# Patient Record
Sex: FEMALE | Race: WHITE
[De-identification: names, ages, dates, MRNs, and addresses within clinical notes are randomized per-mention and may not be internally consistent; named-entity substitution may affect disease eponyms.]

---

## 2018-01-29 ENCOUNTER — HOSPITAL ENCOUNTER (OUTPATIENT)
Dept: HOSPITAL 96 - M.PC | Age: 63
End: 2018-01-29
Payer: COMMERCIAL

## 2018-01-29 DIAGNOSIS — Z87.891: ICD-10-CM

## 2018-01-29 DIAGNOSIS — M47.892: Primary | ICD-10-CM

## 2018-01-29 DIAGNOSIS — G89.29: ICD-10-CM

## 2018-01-29 DIAGNOSIS — G44.209: ICD-10-CM

## 2018-01-31 NOTE — PAINCON
[address]  [address]                    PAIN MANAGEMENT CONSULTATION  
_______________________________________________________________________________
 
Name:       SHERRILL STROUD                Room:                      REG CLI 
M.R.#:  [id number]      Account #:      [id number]  
Admission:  01/29/18     Attend Phys:    Jarek Wynne DO 
Discharge:               Date of Birth:  [birth year]  
         Report #: [id number]
                                                                     [id number]  
_______________________________________________________________________________
THIS REPORT FOR:  //name//                      
 
CC: Jarek Leary DO
 
DATE OF SERVICE:  01/29/2018
 
 
REFERRING PHYSICIAN:  Calista Leary DO
 
CHIEF COMPLAINT:  Neck pain, upper back and chronic headache pain.
 
HISTORY OF PRESENT ILLNESS:  As you know, the patient is a 62-year-old female
who returns today in followup visit with continued neck pain and chronic tension
headaches.  The patient indicates a pain level of 6/10 today.  She states pain
is exacerbated with cold temperature, sitting, walking, standing, lifting,
bending, and certain activities, improves with medications and rest.  The
patient returns for refill of medications.  She has received a prior
authorization request for her Butrans patch.  She indicates good efficacy with
the Butrans patch, in fact she notes waning activity towards the end of the week
with the Butrans patch with recurrence of pain that is intensified up to a level
10/10.  Once the patch is changed and she receives the normal dosing, her pain
reduces back down to its normal level.  She returns to discuss options for
treatment and her concerns of the prior auth for Butrans patch.
 
ALLERGIES:  SULFA.
 
CURRENT MEDICATIONS:  Alprazolam 0.5 mg every 8 hours, baclofen 10 mg t.i.d.,
Rexulti 1 mg per day, Butrans 15 mcg patch q. week, Advair Diskus 250/50, two
puffs b.i.d., Robinul 1 mg twice a day, levothyroxine 100 mcg per day,
methocarbamol 750 mg p.r.n., mirtazapine 15 mg p.o. at bedtime, Percocet 10/325
one tab every 6 hours p.r.n. for pain, Protonix 40 mg per day, Seroquel 25 mg
once a day, and Viibryd 40 mg per day.
 
SOCIAL HISTORY:  The patient continues to smoke somewhere around a pack of
tobacco per day and has done so for years.  Denies IV or illicit drug use. 
Denies any chronic alcohol use.  She is unaccompanied today.
 
IMAGING:  No new imaging available.
 
PHYSICAL EXAMINATION:
VITAL SIGNS:  Blood pressure 151/77, pulse 73, respiratory rate 16 and
unlabored, the patient is 96% on room air, current temperature 98.3 degrees
Fahrenheit, height 5 feet 2-1/2 inches tall, weight 144 pounds, and BMI
calculated 26.
 
 
 
[place] [address]                    PAIN MANAGEMENT CONSULTATION  
_______________________________________________________________________________
 
Name:       SHERRILL STROUD GA                Room:                      REG CLI 
M.R.#:  [id number]      Account #:      [id number]  
Admission:  01/29/18     Attend Phys:    Jarek Wynne DO 
Discharge:               Date of Birth:  [birth year]  
         Report #: [id number]
                                                                     [id number]  
_______________________________________________________________________________
GENERAL:  Well-developed, well-nourished, well-hydrated 62-year-old female,
appears her stated age, smells strongly of tobacco smoke, placing current pain
score 6/10.
HEENT:  Normocephalic, atraumatic.  Pupils are equal, round, and reactive to
light.  Extraocular muscles are intact.
EXTREMITIES:  Show no clubbing, no cyanosis, and no edema.
MUSCULOSKELETAL:  There is palpatory tenderness over the paraspinal musculature
of the cervical spine, no spinous process tenderness.  Cervical provocation
testing includes extension, rotation, lateral flexion all intensify axial neck
and upper back pain.  Moderate-to-severe restriction of motion with lateral
flexion and rotation.  Upper extremity strength appears equal and symmetrical. 
Spurlings test is equivocal.
 
ASSESSMENT:
1.  Cervical spondylosis without radicular symptoms.
2.  Chronic neck pain.
3.  Tension headache.
4.  Cervicalgia.
5.  Chronic intractable pain.
 
PLAN:
1.  The patient returns today in followup visit where we have discussed at
length opioid medication management and the new opioid restrictions proposed by
CDC.  The patient does note good efficacy with Butrans patch, we recommend the
patient continue on the Butrans in favor of a long-term, long-acting medication
formulation, this reduces the potential for tolerance and certainly does reduce
the potential of addiction potentials.  The patient does note good efficacy with
the Butrans patch.  In fact, she notes when the Butrans patch begins to wane in
activity as the end week she states her pain intensifies fairly consistently,
which would indicate that she is getting excellent benefit with the Butrans
patch to the point where she has to use a very little of her breakthrough pain
medication, but at the end of the week, she tends to have to increase the
reliance on oxycodone significantly.  Recommend she continue on this Butrans
patch.  It does appear the patient has received a letter from her third party
payer indicating the Butrans will require prior authorization, this should not
be a problem as the patient is noticing good efficacy with the medication, she
is noticing no side effects to its use and is getting good benefit.  She has
been on this medication for a very long period of time and it has been used
appropriately and provides good analgesic benefit.  The patient was given
prescriptions of the Butrans in prescription form today.
2.  The patient was provided a prescription of Butrans 15 mcg patch 1 patch per
week, #4, release date of today, 4 weeks from today, 8 weeks from today, 3
months' worth of medication.  The patient needs to turn this in about 2 weeks
early, so that the authorization process can be completed before she runs low of
the medication.
3.  The patient was provided a prescription of Percocet 10/325 one tab p.o. q.6
 
 
 
[place] [address]                    PAIN MANAGEMENT CONSULTATION  
_______________________________________________________________________________
 
Name:       SHERRILL STROUD                Room:                      REG CLI 
MKaciKaci#:  [id number]      Account #:      [id number]  
Admission:  01/29/18     Attend Phys:    Jarek Wynne DO 
Discharge:               Date of Birth:  [birth year]  
         Report #: [id number]
                                                                     [id number]  
_______________________________________________________________________________
hours p.r.n. for pain, #120, release dates of today, 4 weeks from today, 8 weeks
from today.  Strongly recommend the patient begin weaning off of this immediate
release opioid.  This medication has lessening affect due to the ongoing use of
buprenorphine and we recommend that she try to reduce the use of this medication
when possible.  Further reductions in this therapy would be recommended at our
next visit, reducing to 3 a day, ultimately coming off the medication in a
timely fashion if at all possible.
4.  The patient and I did discuss the possibility of having her see neurology in
regards to Botox injections for her tension headaches.  This is an effective
treatment option for her chronic tension headaches, which is her main complaint
today.  I have offered to send the patient to neurology for the evaluation and
treatment.  She wishes to consider her options before moving forward.  I highly
recommend this to the patient.  If the patient wishes, she can contact our
clinic and we will provide the referrals as quickly as possible, I believe that
this therapy will improve the patient's headache pain.
5.  We will see the patient back in followup visit in 3 months.  I have
discussed with the patient our desire to have her to be smoke free, smoking has
had direct correlations to chronic pain and the propagation of chronic pain
signaling.  This is done through the descending pain pathway.  I have
recommended to the patient on smoking cessation as part of our treatment goals. 
We have offered to the patient medical management as well as cognitive
behavioral therapy treatments for this in the past and have reoffered again
today.  The patient is going to consider this as an option.
6.  We will see the patient back in followup visit in 3 months for medication
management.
 
 
 
 
 
 
 
 
 
 
 
 
 
 
 
 
 
 
 
<ELECTRONICALLY SIGNED>
                                        By:  Jarek Wynne DO          
01/31/18     0900
D: 01/29/18 0843_______________________________________
T: 01/29/18 1050Jabruce Wynne DO             /nt

## 2018-04-24 ENCOUNTER — HOSPITAL ENCOUNTER (OUTPATIENT)
Dept: HOSPITAL 96 - M.PC | Age: 63
End: 2018-04-24
Attending: ANESTHESIOLOGY
Payer: COMMERCIAL

## 2018-04-24 DIAGNOSIS — M47.892: Primary | ICD-10-CM

## 2018-04-24 DIAGNOSIS — G89.29: ICD-10-CM

## 2018-04-24 DIAGNOSIS — M54.9: ICD-10-CM

## 2018-05-02 NOTE — PAINCON
[address]  [address]                    PAIN MANAGEMENT CONSULTATION  
_______________________________________________________________________________
 
Name:       SHERRILL STROUD                Room:                      REG CLI 
MSOCO#:  [id number]      Account #:      [id number]  
Admission:  04/24/18     Attend Phys:    ROGER Ortiz MD   
Discharge:               Date of Birth:  [birth year]  
         Report #: [id number]
                                                                     [id number]  
_______________________________________________________________________________
THIS REPORT FOR:  //name//                      
 
CC: CALISTA Diggs
 
DATE OF SERVICE:  04/24/2018
 
 
PRIMARY CARE PHYSICIAN:  Calista Leary DO.
 
FOLLOWUP COMPLAINT:  Here for medication renewal.
 
FOLLOWUP HISTORY:  The patient is a 63-year-old female who has been followed in
the Pain Clinic because of chronic pain associated with her upper back as well
as headache pain.  States that she has had pain and discomfort for a number of
years, probably greater than 20 years.  She does have a handicapped child. 
Because of the patient's infirmity, she had to lift and maneuver her over the
years.  This has caused some degenerative joint disease as well as pain and
discomfort in her upper extremity.  She has some limited mobility of her neck. 
Finds that baclofen, meloxicam and oxycodone medications are helpful.  She also
uses a Butrans patch.  Finds that continues to be efficacious.  She denies any
falls.  Denies any problems with mentation.  Denies any feelings of withdrawal
with use of her medication.  Rates her pain as a 7/10 at this juncture.  States
that she is trying to decrease her use of tobacco.
 
ALLERGIES:  SULFA, ROCEPHIN, AND ADHESIVE TAPES.
 
CURRENT MEDICATIONS:  Alprazolam 0.5 mg q.8h. p.r.n. anxiety, baclofen 10 mg
t.i.d. p.r.n., Rexulti one tablet daily, Butrans patch 15 mcg transdermal change
weekly, Advair Diskus 250/50 two puffs for shortness of breath, Glycopyrrolate 1
mg tablet b.i.d. for colon spasms, levothyroxine 0.1 mg, meloxicam 7.5 mg
b.i.d., Robaxin 750 mg q.8 hours, Remeron 15 mg at bedtime, improved sleep and
mood, oxycodone 10/325 one p.o. q. 6 hours p.r.n. pain, Protonix 40 mg b.i.d. 
The patient tried Seroquel 25 mg for about a week and felt that this medication
did not work well for her.
 
PAST MEDICAL HISTORY:  Asthma, thyroid disease, colon problems, stomach
problems, and emotional problems.
 
PAST SURGICAL HISTORY:  Hysterectomy in 2001, bladder surgery in 2004.
 
WORK HISTORY:  The patient worked as an , electronics
technician.  She is not working secondary to chronic depression.
 
REVIEW OF SYSTEMS:  Chronic headache pain, depression, upper back pain, neck
 
 
 
[place] [address]                    PAIN MANAGEMENT CONSULTATION  
_______________________________________________________________________________
 
Name:       SHERRILL STROUD GA                Room:                      REG CLI 
M.R.#:  [id number]      Account #:      [id number]  
Admission:  04/24/18     Attend Phys:    ROGER Ortiz MD   
Discharge:               Date of Birth:  [birth year]  
         Report #: [id number]
                                                                     [id number]  
_______________________________________________________________________________
pain, osteoporosis, thyroid disease.
 
PAIN CLINIC ASSESSMENT:
History of osteoarthritis in the neck area.
Height 5 feet 3 inches, weight 150 pounds, BMI is 27.
VITAL SIGNS:  Blood pressure was 139/73, heart rate 91, respiratory rate 16,
room air saturation 94%, temperature 98.5.
Pain intensity.  7/10.
Fall risk.  The patient has not fallen in the last 3 months.
Blood thinner.  The patient is not on blood thinning medication.
History of hypertension.  The patient has not been treated for hypertension.
Opioid therapy for greater than 6 weeks.  The patient does have an opioid
contract with the Pain Clinic and gets her medication from one source.
Risk assessment tool.
Functional assessment tool.
Recreational drug use.  The patient denies use of recreational drugs.
Tobacco:  The patient has smoked about 20 years, 1 pack of cigarettes per day at
this juncture, states that she is trying to decrease and quit.
Alcohol.  Denies frequent use of alcohol and rates it as occasional.
 
PHYSICAL EXAMINATION:
GENERAL:  The patient is a well-developed white female, appears her stated age. 
She is alert and oriented x 3.  Affect is appropriate.  Speech is fluent.
HEENT:  Normocephalic, atraumatic.  Extraocular eye muscles intact.  Sclerae
nonicteric.  Mucous membranes moist.  Hearing is within normal limits.
NECK:  Without JVD or bruits.
HEART:  Regular rate and rhythm.
ABDOMEN:  Nontender.
MUSCULOSKELETAL:  Without significant kyphosis, lordosis, or scoliosis.
EXTREMITIES:  Upper extremity musculature is judged to be 5/5 for the major
muscle groups with symmetry.  The patient has pain and discomfort in the neck
area.  She is unable to turn her head, left or right more than about 15 degrees
without increased pain and discomfort.  Flexion and extension is limited as
well.  Left and right lateral bending is limited.  The patient does have some
soreness and palpation in the occipital areas.  Lower extremities, muscle
strength is judged to be 5/5 for the major muscle groups.  There is symmetry. 
No sensory changes.
 
IMPRESSION:
1.  Cervical spondylosis without radicular symptoms.
2.  Chronic back pain.
3.  Chronic tension headaches.
4.  Cervicalgia.
5.  Chronic intractable pain.
6.  Radiofrequency denervation, 2007 at C2, C3, C4.
 
 
 
 
[place] [address]                    PAIN MANAGEMENT CONSULTATION  
_______________________________________________________________________________
 
Name:       STROUDMILENA RUDOLPHDESTINEY KOROMA                Room:                      REG CLI 
M.R.#:  [id number]      Account #:      [id number]  
Admission:  04/24/18     Attend Phys:    ROGER Ortiz MD   
Discharge:               Date of Birth:  [birth year]  
         Report #: [id number]
                                                                     [id number]  
_______________________________________________________________________________
RECOMMENDATIONS:  We discussed treatment options with the patient. She feels
that the current medical regimen continues to be helpful.  She is still limited
in her ability to engage in activities of daily living secondary to her pain. 
It is exacerbated by cold temperatures, walking, sitting, standing, lifting, and
bending.  Driving can be somewhat problematic as well because of limitation in
her ability to radiate and move her neck in a significant range of motion.  She
feels that the medications of alprazolam, baclofen, Butrans, meloxicam and
Percocet medications continue to be helpful.  She has undergone radiofrequency
treatment.  At this juncture, we will continue with her current medications.  A
script for her medications have been rewritten.  She will follow up in the near
future.
 
We would like to thank you for letting us participate in her care.  We hope she
continues to improve.
 
 
 
 
 
 
 
 
 
 
 
 
 
 
 
 
 
 
 
 
 
 
 
 
 
 
 
 
 
 
<ELECTRONICALLY SIGNED>
                                        By:  ROGER Ortiz MD            
05/02/18     0818
D: 04/24/18 0954_______________________________________
T: 04/25/18 0240N. Sathish Ortiz MD               /HAWA

## 2018-08-16 ENCOUNTER — HOSPITAL ENCOUNTER (OUTPATIENT)
Dept: HOSPITAL 96 - M.PC | Age: 63
End: 2018-08-16
Attending: ANESTHESIOLOGY
Payer: COMMERCIAL

## 2018-08-16 DIAGNOSIS — M47.812: Primary | ICD-10-CM

## 2018-08-16 DIAGNOSIS — G44.221: ICD-10-CM

## 2018-08-16 DIAGNOSIS — Z79.899: ICD-10-CM

## 2018-08-16 DIAGNOSIS — G89.4: ICD-10-CM

## 2018-09-07 NOTE — PAINCON
[address]  [address]                    PAIN MANAGEMENT CONSULTATION  
_______________________________________________________________________________
 
Name:       SHERRILL STROUD                Room:                      REG CLI 
M.ALICIA#:  [id number]      Account #:      [id number]  
Admission:  08/16/18     Attend Phys:    ROGER Ortiz MD   
Discharge:               Date of Birth:  [birth year]  
         Report #: [id number]
                                                                     [id number]  
_______________________________________________________________________________
THIS REPORT FOR:  //name//                      
 
CC: Drew Goff
 
DATE OF SERVICE:  08/16/2018
 
 
FOLLOWUP COMPLAINT:  Here for medication renewal.
 
FOLLOWUP HISTORY:  The patient is a 63-year-old female who has been seen in the
pain clinic because of chronic pain in her upper back as well as some problems
with headache pain.  She continues to have some pain and discomfort in her neck
as well as in the upper back area.  She has had no significant changes since we
saw her in April 2018.  She has had headaches, which she rates 3-4 per week,
depending on her level of activity.  Finds that the Butrans patch is helpful. 
Rates her pain as 7/10.  As you recall, she has a handicapped child, requires
significant amount of lifting and moving.  She would like to continue with her
medication at this juncture.  She has returned for renewal of her medications.
 
ALLERGIES:  SULFA, ROCEPHIN, AND ADHESIVE TAPE.
 
CURRENT MEDICATIONS:  Alprazolam 0.5 mg q.8 hours for anxiety, baclofen 10 mg
t.i.d., Rexulti one tablet daily, Butrans patch 15 mcg transdermal, change
weekly, Advair Diskus 250/50 two puffs, glycopyrrolate one tablet b.i.d. for
colon spasms, levothyroxine 0.1 mg, Meloxicam 7.5 mg b.i.d., Robaxin 750 mg q. 8
hours, Remeron 15 mg at bedtime, oxycodone 10/325 one p.o. q hours, Protonix 40
mg b.i.d.  The patient has tried Seroquel 25 mg in the past.
 
PAIN CLINIC ASSESSMENT:
1.  Osteoarthritis involving the back and neck.
2.  Height 5 feet 3 inches, weight 141 pounds, BMI is 25.1.
3.  Vital signs:  Blood pressure 144/74, heart rate 78, respiratory rate 16,
room air saturation 92%, temperature 98.3.
4.  Pain intensity 7/10.
5.  Fall risk.  The patient fell while moving a potted plant.
6.  Blood thinner.  The patient is not on a blood thinning medication.
7.  Hypertension.  The patient is not being treated for hypertension.
8.  Opioid.  The patient is receiving Butrans patch from the pain clinics as
well as Percocet.
9.  Risk assessment tool.
10.  Functional assessment tool.
11.  Recreational drug use.  The patient denies use of recreational drugs.
12.  Tobacco:  The patient less than one pack of cigarettes per day, has smoked
for 25 years.
 
 
 
[place] [address]                    PAIN MANAGEMENT CONSULTATION  
_______________________________________________________________________________
 
Name:       SHERRILL STROUD                Room:                      REG CLI 
M.R.#:  [id number]      Account #:      [id number]  
Admission:  08/16/18     Attend Phys:    ROGER Ortiz MD   
Discharge:               Date of Birth:  [birth year]  
         Report #: [id number]
                                                                     [id number]  
_______________________________________________________________________________
13.  Alcohol.  The patient rarely uses alcoholic beverages.
 
PHYSICAL EXAMINATION:
GENERAL:  The patient is a well-developed, well-nourished white female.  Appears
her stated age.  She is alert and oriented x 3.  Affect is appropriate.  Speech
is fluent.
HEENT:  Normocephalic, atraumatic.  Extraocular eye muscles intact.  Sclerae
nonicteric.  Mucous membranes are moist.  Hearing is within normal limits
NECK:  Without JVD or bruits.
HEART:  Regular rate and rhythm.
ABDOMEN:  Nontender.
MUSCULOSKELETAL:  Without kyphosis, lordosis, or scoliosis.  Upper extremity
muscle strength is judged to be 5/5 for the major muscle groups.  There is
symmetry.  The patient has some pain and discomfort in her neck area.  She also
has some increased discomfort when she rotates her head more than 15 degrees. 
Flexion and extension are somewhat limited.  Left and right lateral bending are
limited.  The patient has some soreness in the occipital areas.  Lower extremity
muscle strength is judged to be 5/5 for the major muscle groups with symmetry. 
No sensory changes.
 
IMPRESSION:
1.  Cervical spondylosis without radicular symptoms.
2.  Chronic back pain.
3.  Chronic tension headaches.
4.  Cervicalgia, chronic intractable pain.
5.  Radiofrequency denervation in 2007 at C2, C3, C4.
 
RECOMMENDATIONS:  We discussed the treatment options with the patient.  We will
continue with her current medications.  A script for her medications of
oxycodone, baclofen, Voltaren, Meloxicam, Robaxin, and Butrans patches have been
rewritten.  She will follow up in the future as needed.  She will call us if she
has any concerns regarding her medications.  She is aware that opioid
medications can be problematic.  Constant use of opioid medications were
discussed and the possibility of addiction was discussed.  The patient states
she keeps her medication in a guarded area.  She is aware that the possibility
of decreasing efficacy from these medications exist.  This could be secondary to
development of tolerance.
 
We would like to thank you for letting us participate in her care.  We hope she
continues to improve.
 
 
 
 
<ELECTRONICALLY SIGNED>
                                        By:  ROGER Ortiz MD            
09/07/18     1738
D: 08/30/18 1622_______________________________________
T: 08/30/18 2332N. Sathish Ortiz MD               /nt

## 2020-12-16 ENCOUNTER — HOSPITAL ENCOUNTER (OUTPATIENT)
Dept: HOSPITAL 96 - M.SUR | Age: 65
Discharge: HOME | End: 2020-12-16
Attending: SURGERY
Payer: MEDICAID

## 2020-12-16 DIAGNOSIS — Z90.710: ICD-10-CM

## 2020-12-16 DIAGNOSIS — Z79.899: ICD-10-CM

## 2020-12-16 DIAGNOSIS — K41.30: Primary | ICD-10-CM

## 2020-12-16 DIAGNOSIS — Z20.828: ICD-10-CM

## 2020-12-16 DIAGNOSIS — Z88.2: ICD-10-CM

## 2020-12-16 DIAGNOSIS — J44.9: ICD-10-CM

## 2020-12-16 DIAGNOSIS — F32.9: ICD-10-CM

## 2020-12-16 DIAGNOSIS — F41.9: ICD-10-CM

## 2020-12-16 DIAGNOSIS — F17.210: ICD-10-CM

## 2020-12-16 DIAGNOSIS — K21.9: ICD-10-CM

## 2020-12-16 DIAGNOSIS — Z98.890: ICD-10-CM

## 2020-12-16 LAB
ANION GAP SERPL CALC-SCNC: 4 MMOL/L (ref 7–16)
BUN SERPL-MCNC: 24 MG/DL (ref 7–18)
CALCIUM SERPL-MCNC: 8.6 MG/DL (ref 8.5–10.1)
CHLORIDE SERPL-SCNC: 105 MMOL/L (ref 98–107)
CO2 SERPL-SCNC: 30 MMOL/L (ref 21–32)
CREAT SERPL-MCNC: 0.9 MG/DL (ref 0.6–1.3)
GLUCOSE SERPL-MCNC: 86 MG/DL (ref 70–99)
HCT VFR BLD CALC: 40.4 % (ref 37–47)
HGB BLD-MCNC: 13.3 GM/DL (ref 12–15)
MCH RBC QN AUTO: 27.7 PG (ref 26–34)
MCHC RBC AUTO-ENTMCNC: 32.9 G/DL (ref 28–37)
MCV RBC: 84.1 FL (ref 80–100)
MPV: 7.6 FL. (ref 7.2–11.1)
PLATELET COUNT*: 239 THOU/UL (ref 150–400)
POTASSIUM SERPL-SCNC: 4.5 MMOL/L (ref 3.5–5.1)
RBC # BLD AUTO: 4.81 MIL/UL (ref 4.2–5)
RDW-CV: 14.5 % (ref 10.5–14.5)
SODIUM SERPL-SCNC: 139 MMOL/L (ref 136–145)
WBC # BLD AUTO: 6.5 THOU/UL (ref 4–11)

## 2020-12-16 NOTE — EKG
[place] [address]
Phone:  [phone]                     ELECTROCARDIOGRAM REPORT      
_______________________________________________________________________________
 
Name:         SHERRILL STROUD               Room:                     [facility]#:    [id number]     Account #:     [id number]  
Admission:    20    Attend Phys:   Trent Guerrero
Discharge:                Date of Birth: [birth year]  
Date of Service: 20  Report #:      [id number]
        [id number]
_______________________________________________________________________________
THIS REPORT FOR:  //name//                      
 
                          [facility]
                                       
Test Date:    2020               Test Time:    07:28:08
Pat Name:     SHERRILL TSROUD            Department:   
Patient ID:   [id number]            Room:          
Gender:                               Technician:   [facility]
:          1955               Requested By: Trent Matthews
Order Number: [id number]    Reading MD:   Lux Trotter
                                 Measurements
Intervals                              Axis          
Rate:         80                       P:            87
ND:           165                      QRS:          59
QRSD:         90                       T:            62
QT:           372                                    
QTc:          430                                    
                           Interpretive Statements
Sinus rhythm
Compared to ECG 2017 13:52:52
No significant changes
Electronically Signed On 2020 18:22:10 CST by Lux Trotter
[email or web address]
 
 
 
 
 
 
 
 
 
 
 
 
 
 
 
 
 
 
 
 
 
  <ELECTRONICALLY SIGNED>
                                           By: JERAMIE Trotter MD, [facility]    
  20     1822
D: 20   _____________________________________
T: 20   JERAMIE Trotter MD, [facility]      /EPI

## 2020-12-17 NOTE — OP
[facility] 
[address] [place]  [address]                    OPERATIVE REPORT              
_______________________________________________________________________________
 
Name:       SHERRILL STROUD                Room:                      [facility]#:  [id number]      Account #:      [id number]  
Admission:  12/16/20     Attend Phys:    Trent Matthews
Discharge:               Date of Birth:  [birth year]  
         Report #: [id number]
                                                                     [id number]  
_______________________________________________________________________________
THIS REPORT FOR:  
 
cc:  MELITON KING DO BLYTHE, TIFFANNY, DO ~ Patterson, Jonathan D. MD      
 
DATE OF SERVICE:  12/16/2020
 
 
PREOPERATIVE DIAGNOSIS:  Left femoral hernia.
 
POSTOPERATIVE DIAGNOSIS:  Left femoral hernia.
 
OPERATION:  Laparoscopic repair of left femoral hernia with mesh.
 
SURGEON:  Trent Matthews MD
 
ANESTHESIA:  General.
 
ESTIMATED BLOOD LOSS:  Minimal.
 
SPECIMEN:  None.
 
DESCRIPTION OF PROCEDURE:  After informed consent was obtained, the patient was
brought to the operating room and placed supine.  SCDs were placed and working,
preoperative antibiotics were administered, general anesthesia was induced.  The
abdomen was prepped and draped in the usual sterile fashion.  A 10 mm incision
was made above the umbilicus.  Fascia was incised and a trocar was placed. 
Pneumoperitoneum was established.  Left lower quadrant and right lower quadrant
5 mm trocars were placed under direct vision.  The patient was placed in
Trendelenburg position.
 
The peritoneum at the left ASIS was scored.  It was then incised and reflected
inferiorly.  This allowed visualization of the pubic bone.  The iliac vessels
were protected at all times.  She had incarcerated femoral hernia and this was
all carefully reduced.  The round ligament was identified.
 
A medium Bard 3DMax mesh was inserted.  It was tacked to Elkin's ligament with
2 absorbable tacks.  I then reapproximated the peritoneum with a running V-Loc
suture.  This covered the mesh widely.  The area was then instilled with 20 mL
of 0.5% Marcaine solution.  The ports were removed under direct vision.  The
umbilicus fascia was repaired with a figure-of-eight 0 Vicryl.  Skin was closed
with 4-0 Monocryl.  Incisions were dressed with Steri-Strips.
 
COMPLICATIONS:  None.
 
 
 
[place] [address]                    OPERATIVE REPORT              
_______________________________________________________________________________
 
Name:       SHERRILL STROUD                Room:                      [facility]#:  [id number]      Account #:      [id number]  
Admission:  12/16/20     Attend Phys:    Trent Matthews
Discharge:               Date of Birth:  [birth year]  
         Report #: [id number]
                                                                     [id number]  
_______________________________________________________________________________
 
 
DISPOSITION:  The patient was taken to recovery in satisfactory condition.
 
 
 
 
 
 
 
 
 
 
 
 
 
 
 
 
 
 
 
 
 
 
 
 
 
 
 
 
 
 
 
 
 
 
 
 
 
 
 
 
 
<ELECTRONICALLY SIGNED>
                                        By:  Trent Matthews MD     
12/17/20     1107
D: 12/16/20 1003_______________________________________
T: 12/16/20 1009Trent Matthews MD        /nt

## 2021-10-14 ENCOUNTER — HOSPITAL ENCOUNTER (INPATIENT)
Dept: HOSPITAL 96 - M.ERS | Age: 66
LOS: 7 days | Discharge: HOME | DRG: 246 | End: 2021-10-21
Attending: STUDENT IN AN ORGANIZED HEALTH CARE EDUCATION/TRAINING PROGRAM | Admitting: STUDENT IN AN ORGANIZED HEALTH CARE EDUCATION/TRAINING PROGRAM
Payer: MEDICARE

## 2021-10-14 VITALS — DIASTOLIC BLOOD PRESSURE: 101 MMHG | SYSTOLIC BLOOD PRESSURE: 145 MMHG

## 2021-10-14 VITALS — BODY MASS INDEX: 19.51 KG/M2 | HEIGHT: 62 IN | WEIGHT: 106 LBS

## 2021-10-14 DIAGNOSIS — Z90.710: ICD-10-CM

## 2021-10-14 DIAGNOSIS — F17.210: ICD-10-CM

## 2021-10-14 DIAGNOSIS — Z88.2: ICD-10-CM

## 2021-10-14 DIAGNOSIS — K21.9: ICD-10-CM

## 2021-10-14 DIAGNOSIS — M54.9: ICD-10-CM

## 2021-10-14 DIAGNOSIS — Z71.6: ICD-10-CM

## 2021-10-14 DIAGNOSIS — F32.9: ICD-10-CM

## 2021-10-14 DIAGNOSIS — J44.1: ICD-10-CM

## 2021-10-14 DIAGNOSIS — E78.5: ICD-10-CM

## 2021-10-14 DIAGNOSIS — I21.4: Primary | ICD-10-CM

## 2021-10-14 DIAGNOSIS — J96.01: ICD-10-CM

## 2021-10-14 DIAGNOSIS — F41.9: ICD-10-CM

## 2021-10-14 DIAGNOSIS — Z88.8: ICD-10-CM

## 2021-10-14 DIAGNOSIS — Z20.822: ICD-10-CM

## 2021-10-14 DIAGNOSIS — I50.21: ICD-10-CM

## 2021-10-14 DIAGNOSIS — E03.9: ICD-10-CM

## 2021-10-14 DIAGNOSIS — I25.5: ICD-10-CM

## 2021-10-14 DIAGNOSIS — G89.29: ICD-10-CM

## 2021-10-14 DIAGNOSIS — I11.0: ICD-10-CM

## 2021-10-14 DIAGNOSIS — J45.909: ICD-10-CM

## 2021-10-14 LAB
ABSOLUTE BASOPHILS: 0.1 THOU/UL (ref 0–0.2)
ABSOLUTE EOSINOPHILS: 0.3 THOU/UL (ref 0–0.7)
ABSOLUTE MONOCYTES: 1.1 THOU/UL (ref 0–1.2)
ALBUMIN SERPL-MCNC: 3.7 G/DL (ref 3.4–5)
ALP SERPL-CCNC: 107 U/L (ref 46–116)
ALT SERPL-CCNC: 26 U/L (ref 30–65)
ANION GAP SERPL CALC-SCNC: 14 MMOL/L (ref 7–16)
AST SERPL-CCNC: 25 U/L (ref 15–37)
BASOPHILS NFR BLD AUTO: 1.2 %
BILIRUB SERPL-MCNC: 0.3 MG/DL
BUN SERPL-MCNC: 15 MG/DL (ref 7–18)
CALCIUM SERPL-MCNC: 8.8 MG/DL (ref 8.5–10.1)
CHLORIDE SERPL-SCNC: 103 MMOL/L (ref 98–107)
CO2 SERPL-SCNC: 20 MMOL/L (ref 21–32)
CREAT SERPL-MCNC: 1.2 MG/DL (ref 0.6–1.3)
EOSINOPHIL NFR BLD: 2.9 %
GLUCOSE SERPL-MCNC: 267 MG/DL (ref 70–99)
GRANULOCYTES NFR BLD MANUAL: 50.9 %
HCT VFR BLD CALC: 44.4 % (ref 37–47)
HGB BLD-MCNC: 14.4 GM/DL (ref 12–15)
LIPASE: 108 U/L (ref 73–393)
LYMPHOCYTES # BLD: 2.8 THOU/UL (ref 0.8–5.3)
LYMPHOCYTES NFR BLD AUTO: 32 %
MCH RBC QN AUTO: 27.8 PG (ref 26–34)
MCHC RBC AUTO-ENTMCNC: 32.5 G/DL (ref 28–37)
MCV RBC: 85.3 FL (ref 80–100)
MONOCYTES NFR BLD: 13 %
MPV: 9.2 FL. (ref 7.2–11.1)
NEUTROPHILS # BLD: 4.4 THOU/UL (ref 1.6–8.1)
NT-PRO BRAIN NAT PEPTIDE: 212 PG/ML (ref ?–300)
NUCLEATED RBCS: 0 /100WBC
PLATELET COUNT*: 253 THOU/UL (ref 150–400)
POTASSIUM SERPL-SCNC: 4.6 MMOL/L (ref 3.5–5.1)
PROT SERPL-MCNC: 6.5 G/DL (ref 6.4–8.2)
RBC # BLD AUTO: 5.2 MIL/UL (ref 4.2–5)
RDW-CV: 14.8 % (ref 10.5–14.5)
SODIUM SERPL-SCNC: 137 MMOL/L (ref 136–145)
WBC # BLD AUTO: 8.7 THOU/UL (ref 4–11)

## 2021-10-14 PROCEDURE — 5A09357 ASSISTANCE WITH RESPIRATORY VENTILATION, LESS THAN 24 CONSECUTIVE HOURS, CONTINUOUS POSITIVE AIRWAY PRESSURE: ICD-10-PCS | Performed by: STUDENT IN AN ORGANIZED HEALTH CARE EDUCATION/TRAINING PROGRAM

## 2021-10-15 VITALS — SYSTOLIC BLOOD PRESSURE: 94 MMHG | DIASTOLIC BLOOD PRESSURE: 57 MMHG

## 2021-10-15 VITALS — SYSTOLIC BLOOD PRESSURE: 92 MMHG | DIASTOLIC BLOOD PRESSURE: 56 MMHG

## 2021-10-15 VITALS — DIASTOLIC BLOOD PRESSURE: 59 MMHG | SYSTOLIC BLOOD PRESSURE: 96 MMHG

## 2021-10-15 VITALS — DIASTOLIC BLOOD PRESSURE: 69 MMHG | SYSTOLIC BLOOD PRESSURE: 109 MMHG

## 2021-10-15 VITALS — DIASTOLIC BLOOD PRESSURE: 55 MMHG | SYSTOLIC BLOOD PRESSURE: 105 MMHG

## 2021-10-15 VITALS — DIASTOLIC BLOOD PRESSURE: 62 MMHG | SYSTOLIC BLOOD PRESSURE: 96 MMHG

## 2021-10-15 VITALS — DIASTOLIC BLOOD PRESSURE: 56 MMHG | SYSTOLIC BLOOD PRESSURE: 92 MMHG

## 2021-10-15 VITALS — SYSTOLIC BLOOD PRESSURE: 101 MMHG | DIASTOLIC BLOOD PRESSURE: 64 MMHG

## 2021-10-15 VITALS — DIASTOLIC BLOOD PRESSURE: 62 MMHG | SYSTOLIC BLOOD PRESSURE: 101 MMHG

## 2021-10-15 VITALS — SYSTOLIC BLOOD PRESSURE: 100 MMHG | DIASTOLIC BLOOD PRESSURE: 70 MMHG

## 2021-10-15 VITALS — SYSTOLIC BLOOD PRESSURE: 118 MMHG | DIASTOLIC BLOOD PRESSURE: 67 MMHG

## 2021-10-15 VITALS — DIASTOLIC BLOOD PRESSURE: 57 MMHG | SYSTOLIC BLOOD PRESSURE: 92 MMHG

## 2021-10-15 VITALS — DIASTOLIC BLOOD PRESSURE: 54 MMHG | SYSTOLIC BLOOD PRESSURE: 97 MMHG

## 2021-10-15 VITALS — SYSTOLIC BLOOD PRESSURE: 117 MMHG | DIASTOLIC BLOOD PRESSURE: 68 MMHG

## 2021-10-15 VITALS — SYSTOLIC BLOOD PRESSURE: 179 MMHG | DIASTOLIC BLOOD PRESSURE: 93 MMHG

## 2021-10-15 VITALS — SYSTOLIC BLOOD PRESSURE: 89 MMHG | DIASTOLIC BLOOD PRESSURE: 54 MMHG

## 2021-10-15 VITALS — DIASTOLIC BLOOD PRESSURE: 70 MMHG | SYSTOLIC BLOOD PRESSURE: 109 MMHG

## 2021-10-15 VITALS — DIASTOLIC BLOOD PRESSURE: 57 MMHG | SYSTOLIC BLOOD PRESSURE: 96 MMHG

## 2021-10-15 VITALS — DIASTOLIC BLOOD PRESSURE: 57 MMHG | SYSTOLIC BLOOD PRESSURE: 93 MMHG

## 2021-10-15 LAB
APTT BLD: 24.2 SECONDS (ref 25–31.3)
BE: -2 MMOL/L
INR PPP: 1.1
PCO2 BLD: 40.2 MMHG (ref 35–45)
PO2 BLD: 128.4 MMHG (ref 75–100)
PROTHROMBIN TIME: 11.2 SECONDS (ref 9.2–11.5)

## 2021-10-15 PROCEDURE — 027034Z DILATION OF CORONARY ARTERY, ONE ARTERY WITH DRUG-ELUTING INTRALUMINAL DEVICE, PERCUTANEOUS APPROACH: ICD-10-PCS | Performed by: INTERNAL MEDICINE

## 2021-10-15 PROCEDURE — B215YZZ FLUOROSCOPY OF LEFT HEART USING OTHER CONTRAST: ICD-10-PCS

## 2021-10-15 PROCEDURE — B211YZZ FLUOROSCOPY OF MULTIPLE CORONARY ARTERIES USING OTHER CONTRAST: ICD-10-PCS

## 2021-10-15 PROCEDURE — 4A023N7 MEASUREMENT OF CARDIAC SAMPLING AND PRESSURE, LEFT HEART, PERCUTANEOUS APPROACH: ICD-10-PCS | Performed by: STUDENT IN AN ORGANIZED HEALTH CARE EDUCATION/TRAINING PROGRAM

## 2021-10-15 PROCEDURE — 5A09357 ASSISTANCE WITH RESPIRATORY VENTILATION, LESS THAN 24 CONSECUTIVE HOURS, CONTINUOUS POSITIVE AIRWAY PRESSURE: ICD-10-PCS

## 2021-10-15 NOTE — CARD
[address]  [address]                    CARDIAC CATH REPORT           
_______________________________________________________________________________
 
Name:       SHERRILL STROUD GA                Room:           [address] [place]  
[facility]#:  [id number]      Account #:      [id number]  
Admission:  10/15/21     Attend Phys:    ROSALIO Hinson
Discharge:               Date of Birth:  [birth year]  
         Report #: [id number]
                                                                     [id number]
_______________________________________________________________________________
THIS REPORT FOR:  
 
cc:  FAM - Family physician unknown
     FAM - Family physician unknown                                       
     Cr Aldridge MD [facility]                                            ~
 
 
--------------- APPROVED REPORT --------------
 
 
Study performed:  10/15/2021 14:10:09
 
Patient Details
Patient Status: IN                  Room #: 118
The patient is a 66 year-old female
 
Event Personnel
Balbir Nesbitt RTR Monitor, María Howard RTR Scrub, Cr Aldridge  
Interventional Cardiologist, Brianne Arboleda RN RN
 
Procedures Performed
Left Heart Cath w/or w/o Coronaries [id number] [facility] [place] [address] w/wo Plasty 
Single LAD [id number] Hemostasis with Hemoband
 
Indication
Abnormal ECG, Non-STEMI , Chest pain
 
Risk Factors
Hypercholesterolemia, Hypertension, Tobacco History ()
 
Admission/Lab Medications/Medications given during 
procedure
Glycoprotein IllbIlla Inhibitors, Heparin Unfract., Midazolam 
(Versed) IV 2 mg, Lidocaine Subcut 5 ml, Nitroglycerin  mcg, 
Verapamil IA 2.5 mg, Heparin IV 3000 units, Aggrastat Unknown 5.9 ml, 
Midazolam (Versed) IV 1 mg, Plavix  mg
 
Procedure Narrative
The patient was brought electively to the Cardiac Catheterization 
Laboratory and was prepped and draped in a sterile manner. The right 
wrist was infiltrated with 2% Lidocaine subcutaneous anesthesia. IV 
conscious sedation was used throughout procedure with appropriate 
monitoring and was performed in the presence of a registered nurse 
who was an independent trained observer other than the physician 
performing the procedure. A Slender Glidesheath sheath was inserted 
into the right radial artery. Coronary angiography was performed 
 
 
 
[place] [address]                    CARDIAC CATH REPORT           
_______________________________________________________________________________
 
Name:       SHERRILL STROUD                Room:           [address]#:  [id number]      Account #:      [id number]  
Admission:  10/15/21     Attend Phys:    ROSALIO Hinson
Discharge:               Date of Birth:  [birth year]  
         Report #: [id number]
                                                                     [id number]
_______________________________________________________________________________
 
using coronary diagnostic catheters. The right coronary system was 
accessed and visualized with a Diagnostic JR4 6Fr catheter. The left 
coronary system was accessed and visualized with a Diagnostic JL4 6Fr 
catheter. The left ventricle was accessed and visualized with a 
Diagnostic Pigtail 6Fr catheter. Left ventricular/Aortic Valve 
gradient assessed via catheter pullback. Left ventriculogram was 
performed in MORROW projection. Closure device was deployed with a 6 Fr 
vascband. The patient tolerated the procedure well and there were no 
complications associated with the procedure. There was no 
hematoma.
 
Intraoperative Conscious Sedation
Sedation start time:  1554           Case end Time:  
1632    
      Versed  --3 mg  
 
Fluoro Time:    6.0 minutes     
Dose:     DAP 24098 cGycm2  690.28 mGy  
Contrast Type and Amount:  Visipaque 105 mL    
 
Coronary Angiography
The patient's coronary anatomy is right dominant. 
 
Diagnostic Cath
Left Main 0% stenosis
LAD  70% proximal stenosis.   60% stenosis just distal to takeoff of 
the second diagonal branch.  
Diagonal 2 medium sized vessel with 70% ostial stenosis
Circumflex 0% stenosis
Right Coronary 0% stenosis
 
Left Ventriculography
The left ventricular ejection fraction is estimated to be 25-30%. 
Left ventricular wall motion abnormalities are present. There is no 
mitral insufficiency. Akinesis noted of the mid inferior and mid 
anterior wall.
 
Hemodynamics
The aortic pressure is 113/74 mmHg with a mean of 70 mmHg. The left 
ventricular pressure is 95/12 mmHg with a mean of mmHg. The left 
ventricular end diastolic pressure is 20 mmHg. There was no gradient 
across the aortic valve upon pullback. Pullback from the left 
ventricle to the aorta revealed no gradient across the aortic 
valve.
 
 
 
 
[place] [address]                    CARDIAC CATH REPORT           
_______________________________________________________________________________
 
Name:       STROUDSHERRILL GA                Room:           [address]#:  [id number]      Account #:      [id number]  
Admission:  10/15/21     Attend Phys:    ROSALIO Hinson
Discharge:               Date of Birth:  [birth year]  
         Report #: [id number]
                                                                     [id number]
_______________________________________________________________________________
 
PCI Technique Lesion
Anticoagulation was achieved with Heparin. bolus of IV aggrastat 
given Percutaneous coronary intervention was performed on the 
proximal left anterior descending artery segment. The lesion stenosis 
prior to intervention was 70% with BETTY 3 flow. A 6FR XB LAD 3.0 
100CM Guide Catheter was used to engage the Left ostium. A IG: BMW 
190cm Interventional Guidewire was used to cross the lesion.
 
STENT DEPLOYMENT
A drug-eluting stent Jose A RX Stent 2.27T57gb was inserted and 
inflated up to 10.00atm for 16seconds. Repeat angiography revealed 
the following post-stent deployment results: 0% stenosis. Additional 
Inflation: 12.00atm for 16seconds. Additional Inflation: 13.00atm for 
19seconds.
 
Final angiography reveals 0 % stenosis with BETTY 3 
flow.
 
Conclusion
1.  70% stenosis of the proximal LAD
2.  LVEF 25-30% consistent with stress induced cardiomyopathy.
3.  successful placement of a drug eluting stent in the proximal LAD 
 
Recommendations
Smoking Cessation
 
Medications Administered
Clopidogrel
 
 
 
 
 
 
 
 
 
 
 
 
 
 
 
<ELECTRONICALLY SIGNED>
                                        By:  Cr Aldridge MD, FACC      
10/15/21     1842
D: 10/15/21 1842_______________________________________
T: 10/15/21 1842Ddanielle Aldridge MD, FACC         /INF

## 2021-10-16 VITALS — DIASTOLIC BLOOD PRESSURE: 50 MMHG | SYSTOLIC BLOOD PRESSURE: 96 MMHG

## 2021-10-16 VITALS — SYSTOLIC BLOOD PRESSURE: 105 MMHG | DIASTOLIC BLOOD PRESSURE: 57 MMHG

## 2021-10-16 VITALS — DIASTOLIC BLOOD PRESSURE: 53 MMHG | SYSTOLIC BLOOD PRESSURE: 101 MMHG

## 2021-10-16 VITALS — DIASTOLIC BLOOD PRESSURE: 57 MMHG | SYSTOLIC BLOOD PRESSURE: 90 MMHG

## 2021-10-16 VITALS — DIASTOLIC BLOOD PRESSURE: 54 MMHG | SYSTOLIC BLOOD PRESSURE: 111 MMHG

## 2021-10-16 LAB
ANION GAP SERPL CALC-SCNC: 7 MMOL/L (ref 7–16)
BUN SERPL-MCNC: 19 MG/DL (ref 7–18)
CALCIUM SERPL-MCNC: 7.3 MG/DL (ref 8.5–10.1)
CHLORIDE SERPL-SCNC: 110 MMOL/L (ref 98–107)
CHOLEST SERPL-MCNC: 133 MG/DL (ref ?–200)
CO2 SERPL-SCNC: 25 MMOL/L (ref 21–32)
CREAT SERPL-MCNC: 0.9 MG/DL (ref 0.6–1.3)
GLUCOSE SERPL-MCNC: 92 MG/DL (ref 70–99)
HCT VFR BLD CALC: 38.3 % (ref 37–47)
HDLC SERPL-MCNC: 50 MG/DL (ref 40–?)
HGB BLD-MCNC: 12.4 GM/DL (ref 12–15)
LDLC SERPL-MCNC: 62 MG/DL (ref ?–100)
MCH RBC QN AUTO: 27.4 PG (ref 26–34)
MCHC RBC AUTO-ENTMCNC: 32.4 G/DL (ref 28–37)
MCV RBC: 84.4 FL (ref 80–100)
MPV: 8.7 FL. (ref 7.2–11.1)
PLATELET COUNT*: 198 THOU/UL (ref 150–400)
POTASSIUM SERPL-SCNC: 3.9 MMOL/L (ref 3.5–5.1)
RBC # BLD AUTO: 4.54 MIL/UL (ref 4.2–5)
RDW-CV: 14.6 % (ref 10.5–14.5)
SERUM ASSESSMENT: CLEAR
SODIUM SERPL-SCNC: 142 MMOL/L (ref 136–145)
TC:HDL: 2.7 RATIO
TRIGL SERPL-MCNC: 109 MG/DL (ref ?–150)
VLDLC SERPL CALC-MCNC: 22 MG/DL (ref ?–40)
WBC # BLD AUTO: 8.9 THOU/UL (ref 4–11)

## 2021-10-16 NOTE — CON
[facility] 
[address] [place]  [address]                    CONSULTATION                  
_______________________________________________________________________________
 
Name:       STROUDSHERRILL GA                Room:           [address] [place]  
.R.#:  [id number]      Account #:      [id number]  
Admission:  10/15/21     Attend Phys:    ROSALIO Hinson
Discharge:               Date of Birth:  [birth year]  
         Report #: [id number]
                                                                     [id number]
_______________________________________________________________________________
THIS REPORT FOR:  
 
cc:  FAM - Family physician unknown
     FAM - Family physician unknown                                       
     Cr Aldridge MD [facility]                                            ~
 
 
DATE OF CONSULTATION: 10/15/2021
 
CARDIOLOGY CONSULTATION
 
HISTORY OF PRESENT ILLNESS:  The patient is a 66-year-old single white female 
who I was asked to see in the Emergency Room today after she complained of chest
pain.  The patient states that 6 years ago, she was walking through an airport 
in [place], [place] when she had a syncopal spell.  She was admitted to the 
hospital and told that she had a heart attack secondary to stress.  She 
apparently did not have a heart catheterization.  She notes that recently she 
has had a cough and been short of breath.  She denies any fever or swelling of 
her feet.  She also noticed some tightness in the chest.  It has been there for 
2 days.  It is a constant sensation.  She denied radiating to her arms.  She 
finally came to the hospital yesterday by ambulance and was put on BiPAP.  I was
asked to see her when her troponin was noted to be elevated.  She denied any 
palpitations, recent syncope.
 
PAST MEDICAL HISTORY:  She had a hernia repair, hysterectomy.  She has a history
of hypertension.  She has a history of depression.  She has a history of COPD, 
uses inhaler and nebulized treatment.
 
MEDICATIONS:  At home include albuterol inhaler, Elavil, Abilify, Symbicort, 
Wellbutrin, clonazepam, lisinopril, omeprazole.
 
ALLERGIES:  SHE HAS A PREVIOUS ALLERGY TO SULFA DRUGS.
 
FAMILY HISTORY:  Negative for heart disease.
 
SOCIAL HISTORY:  She is , lives with her daughter in [place].  She 
does not work at this time, smokes half pack of cigarettes a day, rarely drinks 
alcohol.  No illicit drug use.
 
REVIEW OF SYSTEMS:  No history of a stroke.  She has COPD.  No history of liver 
disease, kidney disease, cancer.  She has a history of depression.  She sees a 
psychiatrist.  No chronic skin condition.
 
PHYSICAL EXAMINATION:
GENERAL:  Revealed a middle-aged female, lying in bed.  She appeared in no 
distress.
 
 
 
[place] [address]                    CONSULTATION                  
_______________________________________________________________________________
 
Name:       SHERRILL STROUD GA                Room:           [address]#:  [id number]      Account #:      [id number]  
Admission:  10/15/21     Attend Phys:    ROSALIO Hinson
Discharge:               Date of Birth:  [birth year]  
         Report #: [id number]
                                                                     [id number]
_______________________________________________________________________________
 
 
VITAL SIGNS:  She had a blood pressure of 140/90, pulse is 90.  She is afebrile.
HEENT:  She was anicteric.  Conjunctivae pink.  Mucous membranes are moist.
NECK:  Veins does not appear distended.  No carotid bruits.  Neck is supple.
CHEST:  There are expiratory wheezes.
HEART:  Regular rate and rhythm.  No murmur.
ABDOMEN:  Soft.
EXTREMITIES:  Had no edema.
SKIN:  Cool and dry.
NEUROLOGIC:  Nonfocal.
LYMPHATIC:  No adenopathy.
MUSCULOSKELETAL:  No joint effusion.
 
LABORATORY DATA:  Her ECG on admission showed sinus tachycardia, ventricular 
premature complexes, poor R-wave progression.  Her workup in the Emergency Room 
last night, she had a portable chest x-ray that showed normal heart size, clear 
lung fields.  Her lab work, her sodium 137, creatinine 1.2, glucose is 267.  Her
high sensitivity troponin was 255 on admission, last night went up to 1304.  She
had an LDL of 157 in 2013.  Her urine drug screen had benzodiazepines and 
opiates in the past.  Hemoglobin is 14.4.  Her COVID antigen stat test 
apparently was not done, although she has been vaccinated and she did have a 
stat test last night that was negative.
 
IMPRESSION AND RECOMMENDATIONS:
1.  Non-ST elevation myocardial infarction.  Recommend cardiac catheterization.
2.  Chronic obstructive pulmonary disease.
3.  Chronic back pain.  The patient [facility] the [facility].
4.  Tobacco abuse.
5.  Chronic obstructive pulmonary disease.
6.  Elevated blood sugar.  Recommend ruling out diabetes.
7.  Hypertension.  The patient is on ACE inhibitor.
8.  History of depression.
 
 
 
 
 
 
 
 
 
 
 
<ELECTRONICALLY SIGNED>
                                        By:  Cr Aldridge MD, FACC      
10/16/21     1003
D: 10/15/21 0803_______________________________________
T: 10/15/21 0949Daceci Aldridge MD, FACC         /nt

## 2021-10-17 VITALS — SYSTOLIC BLOOD PRESSURE: 110 MMHG | DIASTOLIC BLOOD PRESSURE: 65 MMHG

## 2021-10-17 VITALS — SYSTOLIC BLOOD PRESSURE: 104 MMHG | DIASTOLIC BLOOD PRESSURE: 58 MMHG

## 2021-10-17 VITALS — SYSTOLIC BLOOD PRESSURE: 93 MMHG | DIASTOLIC BLOOD PRESSURE: 55 MMHG

## 2021-10-17 VITALS — SYSTOLIC BLOOD PRESSURE: 95 MMHG | DIASTOLIC BLOOD PRESSURE: 37 MMHG

## 2021-10-17 VITALS — SYSTOLIC BLOOD PRESSURE: 81 MMHG | DIASTOLIC BLOOD PRESSURE: 44 MMHG

## 2021-10-17 LAB
ANION GAP SERPL CALC-SCNC: 11 MMOL/L (ref 7–16)
BUN SERPL-MCNC: 29 MG/DL (ref 7–18)
CALCIUM SERPL-MCNC: 9.4 MG/DL (ref 8.5–10.1)
CHLORIDE SERPL-SCNC: 101 MMOL/L (ref 98–107)
CO2 SERPL-SCNC: 26 MMOL/L (ref 21–32)
CREAT SERPL-MCNC: 0.9 MG/DL (ref 0.6–1.3)
GLUCOSE SERPL-MCNC: 106 MG/DL (ref 70–99)
POTASSIUM SERPL-SCNC: 6.5 MMOL/L (ref 3.5–5.1)
SODIUM SERPL-SCNC: 138 MMOL/L (ref 136–145)

## 2021-10-18 VITALS — SYSTOLIC BLOOD PRESSURE: 110 MMHG | DIASTOLIC BLOOD PRESSURE: 68 MMHG

## 2021-10-18 VITALS — DIASTOLIC BLOOD PRESSURE: 64 MMHG | SYSTOLIC BLOOD PRESSURE: 116 MMHG

## 2021-10-18 VITALS — DIASTOLIC BLOOD PRESSURE: 67 MMHG | SYSTOLIC BLOOD PRESSURE: 123 MMHG

## 2021-10-18 VITALS — SYSTOLIC BLOOD PRESSURE: 116 MMHG | DIASTOLIC BLOOD PRESSURE: 62 MMHG

## 2021-10-18 VITALS — SYSTOLIC BLOOD PRESSURE: 128 MMHG | DIASTOLIC BLOOD PRESSURE: 73 MMHG

## 2021-10-18 VITALS — SYSTOLIC BLOOD PRESSURE: 99 MMHG | DIASTOLIC BLOOD PRESSURE: 61 MMHG

## 2021-10-18 LAB
ALBUMIN SERPL-MCNC: 3.3 G/DL (ref 3.4–5)
ALP SERPL-CCNC: 98 U/L (ref 46–116)
ALT SERPL-CCNC: 35 U/L (ref 30–65)
ANION GAP SERPL CALC-SCNC: 9 MMOL/L (ref 7–16)
AST SERPL-CCNC: 20 U/L (ref 15–37)
BE: 2.9 MMOL/L
BILIRUB SERPL-MCNC: 0.4 MG/DL
BUN SERPL-MCNC: 41 MG/DL (ref 7–18)
CALCIUM SERPL-MCNC: 9.3 MG/DL (ref 8.5–10.1)
CHLORIDE SERPL-SCNC: 100 MMOL/L (ref 98–107)
CO2 SERPL-SCNC: 31 MMOL/L (ref 21–32)
CREAT SERPL-MCNC: 1.2 MG/DL (ref 0.6–1.3)
GAS PNL BLDV: 37.7 MMHG (ref 35–45)
GLUCOSE SERPL-MCNC: 111 MG/DL (ref 70–99)
HCT VFR BLD CALC: 44.8 % (ref 37–47)
HGB BLD-MCNC: 14.8 GM/DL (ref 12–15)
MAGNESIUM SERPL-MCNC: 2.4 MG/DL (ref 1.8–2.4)
MCH RBC QN AUTO: 27.5 PG (ref 26–34)
MCHC RBC AUTO-ENTMCNC: 33 G/DL (ref 28–37)
MCV RBC: 83.2 FL (ref 80–100)
MPV: 9 FL. (ref 7.2–11.1)
PCO2 BLDV: 48.4 MMHG (ref 41–51)
PLATELET COUNT*: 229 THOU/UL (ref 150–400)
POTASSIUM SERPL-SCNC: 4.5 MMOL/L (ref 3.5–5.1)
PROT SERPL-MCNC: 6.5 G/DL (ref 6.4–8.2)
RBC # BLD AUTO: 5.39 MIL/UL (ref 4.2–5)
RDW-CV: 14.3 % (ref 10.5–14.5)
SODIUM SERPL-SCNC: 140 MMOL/L (ref 136–145)
WBC # BLD AUTO: 10.3 THOU/UL (ref 4–11)

## 2021-10-18 NOTE — [place] [address]
Phone:  [phone]                     ELECTROCARDIOGRAM REPORT      
_______________________________________________________________________________
 
Name:         SHERRILL STROUD               Room:          [address]    ADM [place] 
M.R.#:    [id number]     Account #:     [id number]  
Admission:    10/15/21    Attend Phys:   Kath Taylor
Discharge:                Date of Birth: [birth year]  
Date of Service: 10/18/21 1113  Report #:      [id number]
        [id number]
_______________________________________________________________________________
THIS REPORT FOR:  //name//                      
 
                          [facility]
                                       
Test Date:    2021-10-18               Test Time:    11:13:06
Pat Name:     SHERRILL STROUD            Department:   
Patient ID:   [id number]            Room:         [address]
Gender:       F                        Technician:   [id number]
:          1955               Requested By: Cr Aldridge
Order Number: [id number]    Reading MD:   Cr Aldirdge
                                 Measurements
Intervals                              Axis          
Rate:         84                       P:            88
DC:           151                      QRS:          48
QRSD:         78                       T:            255
QT:           396                                    
QTc:          469                                    
                           Interpretive Statements
Sinus rhythm
Repol abnrm suggests ischemia, anterolateral
Compared to ECG 10/15/2021 18:57:57
Early repolarization now present
Possible ischemia now present
Electronically Signed On 10- 16:15:06 CDT by Cr Aldridge
[email or web address]
 
 
 
 
 
 
 
 
 
 
 
 
 
 
 
 
 
 
 
  <ELECTRONICALLY SIGNED>
                                           By: Cr Aldridge MD, [facility]      
  10/18/21     1615
D: 10/18/21 1113   _____________________________________
T: 10/18/21 1113   Cr Aldridge MD, [facility]        /[place] [address]
Phone:  [phone]                     ELECTROCARDIOGRAM REPORT      
_______________________________________________________________________________
 
Name:         SHERRILL STROUD               Room:          [address]    ADM [place] 
..#:    [id number]     Account #:     [id number]  
Admission:    10/15/21    Attend Phys:   Kath Taylor
Discharge:                Date of Birth: [birth year]  
Date of Service: 10/14/21 225  Report #:      [id number]
        [id number]
_______________________________________________________________________________
THIS REPORT FOR:  //name//                      
 
                         [facility] ED
                                       
Test Date:    2021-10-14               Test Time:    22:51:12
Pat Name:     SHERRILL STROUD            Department:   
Patient ID:   [id number]            Room:         [facility]
Gender:       F                        Technician:   FL
:          1955               Requested By: Erin Jamil
Order Number: [id number] MD:   Cr Aldridge
                                 Measurements
Intervals                              Axis          
Rate:         113                      P:            88
ID:           162                      QRS:          -52
QRSD:         110                      T:            92
QT:           351                                    
QTc:          482                                    
                           Interpretive Statements
Sinus tachycardia
left axis
 
Probable anteroseptal infarct
 
Compared to ECG 2020 07:28:08
Myocardial infarct finding now present
Sinus rhythm no longer present
Electronically Signed On 10- 9:56:51 CDT by Cr Aldridge
[email or web address]
 
 
 
 
 
 
 
 
 
 
 
 
 
 
 
 
  <ELECTRONICALLY SIGNED>
                                           By: Cr Aldridge MD, FACC      
  10/15/21     0956
D: 10/14/21 2251   _____________________________________
T: 10/14/21 2251   Cr Aldridge MD, FACC        /[place] [address]
Phone:  [phone]                     ELECTROCARDIOGRAM REPORT      
_______________________________________________________________________________
 
Name:         SHERRILL STROUD               Room:          [address]    ADM [place] 
[facility].#:    [id number]     Account #:     [id number]  
Admission:    10/15/21    Attend Phys:   Kath Taylor
Discharge:                Date of Birth: [birth year]  
Date of Service: 10/15/21 0157  Report #:      [id number]
        [id number]
_______________________________________________________________________________
THIS REPORT FOR:  //name//                      
 
                         [facility] ED
                                       
Test Date:    2021-10-15               Test Time:    01:57:50
Pat Name:     SHERRILL STROUD            Department:   
Patient ID:   [id number]            Room:         [facility]
Gender:       F                        Technician:   FL
:          1955               Requested By: Erin Jamil
Order Number: [id number] MD:   Cr Aldridge
                                 Measurements
Intervals                              Axis          
Rate:         88                       P:            80
ND:           149                      QRS:          69
QRSD:         86                       T:            66
QT:           382                                    
QTc:          463                                    
                           Interpretive Statements
Sinus rhythm
septal infarct, old
Compared to ECG 10/14/2021 22:52:33
Sinus tachycardia no longer present
Myocardial infarct finding still present
Electronically Signed On 10- 17:01:49 CDT by Cr Aldridge
[email or web address]
 
 
 
 
 
 
 
 
 
 
 
 
 
 
 
 
 
 
 
  <ELECTRONICALLY SIGNED>
                                           By: Cr Aldridge MD, FACC      
  10/15/21     1701
D: 10/15/21 0157   _____________________________________
T: 10/15/21 015   Cr Aldridge MD, FAC        /[place]PI
[place] [address]
Phone:  [phone]                     ELECTROCARDIOGRAM REPORT      
_______________________________________________________________________________
 
Name:         SHERRILL STROUD               Room:          [address]    ADM [place] 
M.R.#:    [id number]     Account #:     [id number]  
Admission:    10/15/21    Attend Phys:   Kath Taylor
Discharge:                Date of Birth: [birth year]  
Date of Service: 10/15/21 1857  Report #:      [id number]
        [id number]
_______________________________________________________________________________
THIS REPORT FOR:  //name//                      
 
                          [facility]
                                       
Test Date:    2021-10-15               Test Time:    18:57:57
Pat Name:     SHERRILL STROUD            Department:   
Patient ID:   [id number]            Room:         [address]
Gender:       F                        Technician:   
:          1955               Requested By: Cr Aldridge
Order Number: [id number]    Reading MD:   Cr Aldridge
                                 Measurements
Intervals                              Axis          
Rate:         67                       P:            80
NV:           169                      QRS:          67
QRSD:         79                       T:            260
QT:           439                                    
QTc:          464                                    
                           Interpretive Statements
Sinus rhythm
septal infarct, old
Nonspecific T abnormalities, inferior leads
Compared to ECG 10/15/2021 01:57:50
T-wave abnormality now present
Myocardial infarct finding still present
Electronically Signed On 10- 10:16:38 CDT by Cr Aldridge
[email or web address]
 
 
 
 
 
 
 
 
 
 
 
 
 
 
 
 
 
 
  <ELECTRONICALLY SIGNED>
                                           By: Cr Aldridge MD, FACC      
  10/16/21     1016
D: 10/15/21 1857   _____________________________________
T: 10/15/21 1857   Cr Aldridge MD, FACC        /EPI
Principal Discharge DX:	[place] (acute kidney injury)  Secondary Diagnosis:	Elevated troponin  Secondary Diagnosis:	Fall

## 2021-10-19 VITALS — DIASTOLIC BLOOD PRESSURE: 71 MMHG | SYSTOLIC BLOOD PRESSURE: 108 MMHG

## 2021-10-19 VITALS — DIASTOLIC BLOOD PRESSURE: 52 MMHG | SYSTOLIC BLOOD PRESSURE: 90 MMHG

## 2021-10-19 VITALS — DIASTOLIC BLOOD PRESSURE: 65 MMHG | SYSTOLIC BLOOD PRESSURE: 109 MMHG

## 2021-10-19 VITALS — DIASTOLIC BLOOD PRESSURE: 84 MMHG | SYSTOLIC BLOOD PRESSURE: 135 MMHG

## 2021-10-19 VITALS — DIASTOLIC BLOOD PRESSURE: 68 MMHG | SYSTOLIC BLOOD PRESSURE: 110 MMHG

## 2021-10-19 VITALS — SYSTOLIC BLOOD PRESSURE: 101 MMHG | DIASTOLIC BLOOD PRESSURE: 64 MMHG

## 2021-10-20 VITALS — DIASTOLIC BLOOD PRESSURE: 69 MMHG | SYSTOLIC BLOOD PRESSURE: 114 MMHG

## 2021-10-20 VITALS — SYSTOLIC BLOOD PRESSURE: 121 MMHG | DIASTOLIC BLOOD PRESSURE: 66 MMHG

## 2021-10-20 VITALS — SYSTOLIC BLOOD PRESSURE: 113 MMHG | DIASTOLIC BLOOD PRESSURE: 73 MMHG

## 2021-10-20 VITALS — DIASTOLIC BLOOD PRESSURE: 66 MMHG | SYSTOLIC BLOOD PRESSURE: 121 MMHG

## 2021-10-20 VITALS — SYSTOLIC BLOOD PRESSURE: 119 MMHG | DIASTOLIC BLOOD PRESSURE: 71 MMHG

## 2021-10-20 VITALS — DIASTOLIC BLOOD PRESSURE: 94 MMHG | SYSTOLIC BLOOD PRESSURE: 154 MMHG

## 2021-10-20 PROCEDURE — 5A0935A ASSISTANCE WITH RESPIRATORY VENTILATION, LESS THAN 24 CONSECUTIVE HOURS, HIGH NASAL FLOW/VELOCITY: ICD-10-PCS | Performed by: STUDENT IN AN ORGANIZED HEALTH CARE EDUCATION/TRAINING PROGRAM

## 2021-10-21 VITALS — DIASTOLIC BLOOD PRESSURE: 71 MMHG | SYSTOLIC BLOOD PRESSURE: 113 MMHG

## 2021-10-21 VITALS — SYSTOLIC BLOOD PRESSURE: 113 MMHG | DIASTOLIC BLOOD PRESSURE: 71 MMHG

## 2021-10-21 VITALS — SYSTOLIC BLOOD PRESSURE: 132 MMHG | DIASTOLIC BLOOD PRESSURE: 74 MMHG

## 2021-10-21 VITALS — SYSTOLIC BLOOD PRESSURE: 138 MMHG | DIASTOLIC BLOOD PRESSURE: 83 MMHG

## 2021-10-21 VITALS — DIASTOLIC BLOOD PRESSURE: 69 MMHG | SYSTOLIC BLOOD PRESSURE: 126 MMHG
